# Patient Record
Sex: MALE | Race: OTHER | NOT HISPANIC OR LATINO | URBAN - METROPOLITAN AREA
[De-identification: names, ages, dates, MRNs, and addresses within clinical notes are randomized per-mention and may not be internally consistent; named-entity substitution may affect disease eponyms.]

---

## 2018-08-17 ENCOUNTER — EMERGENCY (EMERGENCY)
Facility: HOSPITAL | Age: 28
LOS: 1 days | Discharge: HOME | End: 2018-08-17
Attending: EMERGENCY MEDICINE
Payer: MEDICAID

## 2018-08-17 VITALS
SYSTOLIC BLOOD PRESSURE: 131 MMHG | DIASTOLIC BLOOD PRESSURE: 86 MMHG | TEMPERATURE: 98 F | RESPIRATION RATE: 18 BRPM | HEART RATE: 86 BPM | OXYGEN SATURATION: 99 %

## 2018-08-17 PROCEDURE — 99283 EMERGENCY DEPT VISIT LOW MDM: CPT

## 2018-08-17 RX ORDER — OXYCODONE HYDROCHLORIDE 5 MG/1
1 TABLET ORAL
Qty: 12 | Refills: 0 | OUTPATIENT
Start: 2018-08-17 | End: 2018-08-19

## 2018-08-17 RX ORDER — OXYCODONE HYDROCHLORIDE 5 MG/1
5 TABLET ORAL ONCE
Qty: 0 | Refills: 0 | Status: DISCONTINUED | OUTPATIENT
Start: 2018-08-17 | End: 2018-08-17

## 2018-08-17 RX ORDER — ACETAMINOPHEN 500 MG
975 TABLET ORAL ONCE
Qty: 0 | Refills: 0 | Status: COMPLETED | OUTPATIENT
Start: 2018-08-17 | End: 2018-08-17

## 2018-08-17 RX ADMIN — OXYCODONE HYDROCHLORIDE 5 MILLIGRAM(S): 5 TABLET ORAL at 16:54

## 2018-08-17 RX ADMIN — OXYCODONE HYDROCHLORIDE 5 MILLIGRAM(S): 5 TABLET ORAL at 17:05

## 2018-08-17 RX ADMIN — Medication 975 MILLIGRAM(S): at 16:54

## 2018-08-17 NOTE — ED PROVIDER NOTE - OBJECTIVE STATEMENT
27yo male pt, no significant PMHx, ambulatory 27yo male pt, no significant PMHx, ambulatory c/o right low wisdom toothache since yesterday. Denies fever, chills or facial swelling. Denies sensory changes or weakness to extremities. Denies CP/SOB/ABD pain or N/V/D. Denies cough or congestion.

## 2018-08-17 NOTE — ED PROVIDER NOTE - PHYSICAL EXAMINATION
NAD, VSS, Afebrile, No obvious facial swelling, + Right low wisdom tooth impaction with tender, no fluctuating gum swelling palpable. Neck supple without lymphadenopathy. Neuro- intact.

## 2018-08-17 NOTE — ED ADULT NURSE NOTE - NSIMPLEMENTINTERV_GEN_ALL_ED
Implemented All Universal Safety Interventions:  [place] to call system. Call bell, personal items and telephone within reach. Instruct patient to call for assistance. Room bathroom lighting operational. Non-slip footwear when patient is off stretcher. Physically safe environment: no spills, clutter or unnecessary equipment. Stretcher in lowest position, wheels locked, appropriate side rails in place.

## 2018-08-17 NOTE — ED ADULT NURSE NOTE - OBJECTIVE STATEMENT
27 y/o male presents to ED from home c/o R lower wisdom tooth ache since yesterday. Patient denies facial swelling, fever/chills. Patient resting in bed, plan of care explained.

## 2018-08-17 NOTE — ED PROVIDER NOTE - ATTENDING CONTRIBUTION TO CARE
Patient presenting with R lower tooth pain, no fevers or chills.  On exam patient has impacted appearing R lower wisdom tooth, no noted fluctuance/edema, no caries.  Will treat pain and refer to dental for further care.

## 2019-08-26 NOTE — ED PROVIDER NOTE - DISCHARGE DATE
Spoke to patient and answered additional questions r/t patient's surgery. Patient verbalizes understanding. 17-Aug-2018

## 2021-10-21 NOTE — ED ADULT NURSE NOTE - NSSISCREENINGQ3_ED_A_ED
JOSE M (ATN secondary to hypotension and contrast) with uremia with hx of nephrectomy for RCC resolved at baseline              - Cr 1.18 -> 1.20 -> 1.25 on (10/19)               - Nephrology signed off, remain off diuretics, decrease FWF to 200 mL q2h              - per renal, Mild decline in GFR with recent polyuria should stabilize/improve as polyuria resolved   No
